# Patient Record
Sex: FEMALE | Race: WHITE | NOT HISPANIC OR LATINO | Employment: OTHER | URBAN - METROPOLITAN AREA
[De-identification: names, ages, dates, MRNs, and addresses within clinical notes are randomized per-mention and may not be internally consistent; named-entity substitution may affect disease eponyms.]

---

## 2017-03-28 ENCOUNTER — HOSPITAL ENCOUNTER (OUTPATIENT)
Dept: MAMMOGRAPHY | Facility: HOSPITAL | Age: 77
Discharge: HOME | End: 2017-03-28

## 2017-03-28 DIAGNOSIS — Z12.31 VISIT FOR SCREENING MAMMOGRAM: ICD-10-CM

## 2018-08-15 ENCOUNTER — HOSPITAL ENCOUNTER (OUTPATIENT)
Dept: MAMMOGRAPHY | Facility: CLINIC | Age: 78
Discharge: HOME | End: 2018-08-15

## 2018-08-15 DIAGNOSIS — Z12.31 SCREENING MAMMOGRAM, ENCOUNTER FOR: ICD-10-CM

## 2020-03-17 ENCOUNTER — HOSPITAL ENCOUNTER (INPATIENT)
Facility: CLINIC | Age: 80
Setting detail: SURGERY ADMIT
End: 2020-03-17
Attending: NEUROLOGICAL SURGERY | Admitting: NEUROLOGICAL SURGERY
Payer: MEDICARE

## 2021-05-24 ENCOUNTER — RECORDS - HEALTHEAST (OUTPATIENT)
Dept: ADMINISTRATIVE | Facility: CLINIC | Age: 81
End: 2021-05-24

## 2021-05-25 ENCOUNTER — RECORDS - HEALTHEAST (OUTPATIENT)
Dept: ADMINISTRATIVE | Facility: CLINIC | Age: 81
End: 2021-05-25

## 2021-05-26 ENCOUNTER — RECORDS - HEALTHEAST (OUTPATIENT)
Dept: ADMINISTRATIVE | Facility: CLINIC | Age: 81
End: 2021-05-26

## 2021-05-27 ENCOUNTER — RECORDS - HEALTHEAST (OUTPATIENT)
Dept: ADMINISTRATIVE | Facility: CLINIC | Age: 81
End: 2021-05-27

## 2021-05-28 ENCOUNTER — RECORDS - HEALTHEAST (OUTPATIENT)
Dept: ADMINISTRATIVE | Facility: CLINIC | Age: 81
End: 2021-05-28

## 2021-05-29 ENCOUNTER — RECORDS - HEALTHEAST (OUTPATIENT)
Dept: ADMINISTRATIVE | Facility: CLINIC | Age: 81
End: 2021-05-29

## 2021-05-30 ENCOUNTER — RECORDS - HEALTHEAST (OUTPATIENT)
Dept: ADMINISTRATIVE | Facility: CLINIC | Age: 81
End: 2021-05-30

## 2021-07-13 ENCOUNTER — RECORDS - HEALTHEAST (OUTPATIENT)
Dept: ADMINISTRATIVE | Facility: CLINIC | Age: 81
End: 2021-07-13

## 2021-07-21 ENCOUNTER — RECORDS - HEALTHEAST (OUTPATIENT)
Dept: ADMINISTRATIVE | Facility: CLINIC | Age: 81
End: 2021-07-21

## 2024-08-05 ENCOUNTER — HOSPITAL ENCOUNTER (EMERGENCY)
Facility: HOSPITAL | Age: 84
Discharge: HOME | End: 2024-08-05
Admitting: STUDENT IN AN ORGANIZED HEALTH CARE EDUCATION/TRAINING PROGRAM
Payer: MEDICARE

## 2024-08-05 ENCOUNTER — APPOINTMENT (OUTPATIENT)
Dept: CT IMAGING | Facility: HOSPITAL | Age: 84
End: 2024-08-05
Attending: STUDENT IN AN ORGANIZED HEALTH CARE EDUCATION/TRAINING PROGRAM
Payer: MEDICARE

## 2024-08-05 VITALS
WEIGHT: 110 LBS | TEMPERATURE: 98.1 F | SYSTOLIC BLOOD PRESSURE: 129 MMHG | BODY MASS INDEX: 18.78 KG/M2 | HEIGHT: 64 IN | OXYGEN SATURATION: 99 % | HEART RATE: 57 BPM | RESPIRATION RATE: 16 BRPM | DIASTOLIC BLOOD PRESSURE: 60 MMHG

## 2024-08-05 DIAGNOSIS — J10.1 INFLUENZA A: ICD-10-CM

## 2024-08-05 LAB
ALBUMIN SERPL BCG-MCNC: 4.1 G/DL (ref 3.5–5.2)
ALBUMIN UR-MCNC: NEGATIVE MG/DL
ALP SERPL-CCNC: 65 U/L (ref 40–150)
ALT SERPL W P-5'-P-CCNC: 19 U/L (ref 0–50)
ANION GAP SERPL CALCULATED.3IONS-SCNC: 18 MMOL/L (ref 7–15)
APPEARANCE UR: CLEAR
AST SERPL W P-5'-P-CCNC: 30 U/L (ref 0–45)
BASOPHILS # BLD AUTO: 0 10E3/UL (ref 0–0.2)
BASOPHILS NFR BLD AUTO: 0 %
BILIRUB DIRECT SERPL-MCNC: 0.21 MG/DL (ref 0–0.3)
BILIRUB SERPL-MCNC: 0.9 MG/DL
BILIRUB UR QL STRIP: NEGATIVE
BUN SERPL-MCNC: 10.8 MG/DL (ref 8–23)
CALCIUM SERPL-MCNC: 8.8 MG/DL (ref 8.8–10.4)
CHLORIDE SERPL-SCNC: 95 MMOL/L (ref 98–107)
COLOR UR AUTO: COLORLESS
CREAT SERPL-MCNC: 0.69 MG/DL (ref 0.51–0.95)
EGFRCR SERPLBLD CKD-EPI 2021: 86 ML/MIN/1.73M2
EOSINOPHIL # BLD AUTO: 0 10E3/UL (ref 0–0.7)
EOSINOPHIL NFR BLD AUTO: 0 %
ERYTHROCYTE [DISTWIDTH] IN BLOOD BY AUTOMATED COUNT: 11.8 % (ref 10–15)
GLUCOSE SERPL-MCNC: 88 MG/DL (ref 70–99)
GLUCOSE UR STRIP-MCNC: NEGATIVE MG/DL
HCO3 SERPL-SCNC: 22 MMOL/L (ref 22–29)
HCT VFR BLD AUTO: 45.8 % (ref 35–47)
HGB BLD-MCNC: 15.5 G/DL (ref 11.7–15.7)
HGB UR QL STRIP: NEGATIVE
IMM GRANULOCYTES # BLD: 0.1 10E3/UL
IMM GRANULOCYTES NFR BLD: 1 %
KETONES UR STRIP-MCNC: 100 MG/DL
LEUKOCYTE ESTERASE UR QL STRIP: NEGATIVE
LYMPHOCYTES # BLD AUTO: 0.9 10E3/UL (ref 0.8–5.3)
LYMPHOCYTES NFR BLD AUTO: 9 %
MAGNESIUM SERPL-MCNC: 1.9 MG/DL (ref 1.7–2.3)
MCH RBC QN AUTO: 29.5 PG (ref 26.5–33)
MCHC RBC AUTO-ENTMCNC: 33.8 G/DL (ref 31.5–36.5)
MCV RBC AUTO: 87 FL (ref 78–100)
MONOCYTES # BLD AUTO: 0.8 10E3/UL (ref 0–1.3)
MONOCYTES NFR BLD AUTO: 8 %
MUCOUS THREADS #/AREA URNS LPF: PRESENT /LPF
NEUTROPHILS # BLD AUTO: 7.9 10E3/UL (ref 1.6–8.3)
NEUTROPHILS NFR BLD AUTO: 82 %
NITRATE UR QL: NEGATIVE
NRBC # BLD AUTO: 0 10E3/UL
NRBC BLD AUTO-RTO: 0 /100
PH UR STRIP: 5.5 [PH] (ref 5–7)
PLATELET # BLD AUTO: 314 10E3/UL (ref 150–450)
POTASSIUM SERPL-SCNC: 4 MMOL/L (ref 3.4–5.3)
PROT SERPL-MCNC: 6.7 G/DL (ref 6.4–8.3)
RBC # BLD AUTO: 5.26 10E6/UL (ref 3.8–5.2)
RBC URINE: <1 /HPF
SODIUM SERPL-SCNC: 135 MMOL/L (ref 135–145)
SP GR UR STRIP: 1.01 (ref 1–1.03)
SQUAMOUS EPITHELIAL: 1 /HPF
TROPONIN T SERPL HS-MCNC: <6 NG/L
UROBILINOGEN UR STRIP-MCNC: <2 MG/DL
WBC # BLD AUTO: 9.6 10E3/UL (ref 4–11)
WBC URINE: 1 /HPF

## 2024-08-05 PROCEDURE — 258N000003 HC RX IP 258 OP 636: Performed by: STUDENT IN AN ORGANIZED HEALTH CARE EDUCATION/TRAINING PROGRAM

## 2024-08-05 PROCEDURE — 85004 AUTOMATED DIFF WBC COUNT: CPT | Performed by: STUDENT IN AN ORGANIZED HEALTH CARE EDUCATION/TRAINING PROGRAM

## 2024-08-05 PROCEDURE — 250N000011 HC RX IP 250 OP 636: Performed by: STUDENT IN AN ORGANIZED HEALTH CARE EDUCATION/TRAINING PROGRAM

## 2024-08-05 PROCEDURE — 71260 CT THORAX DX C+: CPT | Mod: MG

## 2024-08-05 PROCEDURE — 80053 COMPREHEN METABOLIC PANEL: CPT | Performed by: STUDENT IN AN ORGANIZED HEALTH CARE EDUCATION/TRAINING PROGRAM

## 2024-08-05 PROCEDURE — 83735 ASSAY OF MAGNESIUM: CPT | Performed by: STUDENT IN AN ORGANIZED HEALTH CARE EDUCATION/TRAINING PROGRAM

## 2024-08-05 PROCEDURE — 96374 THER/PROPH/DIAG INJ IV PUSH: CPT | Mod: 59

## 2024-08-05 PROCEDURE — 93005 ELECTROCARDIOGRAM TRACING: CPT | Performed by: STUDENT IN AN ORGANIZED HEALTH CARE EDUCATION/TRAINING PROGRAM

## 2024-08-05 PROCEDURE — 96361 HYDRATE IV INFUSION ADD-ON: CPT

## 2024-08-05 PROCEDURE — 36415 COLL VENOUS BLD VENIPUNCTURE: CPT | Performed by: STUDENT IN AN ORGANIZED HEALTH CARE EDUCATION/TRAINING PROGRAM

## 2024-08-05 PROCEDURE — 81003 URINALYSIS AUTO W/O SCOPE: CPT | Performed by: STUDENT IN AN ORGANIZED HEALTH CARE EDUCATION/TRAINING PROGRAM

## 2024-08-05 PROCEDURE — 99285 EMERGENCY DEPT VISIT HI MDM: CPT | Mod: 25

## 2024-08-05 PROCEDURE — 84484 ASSAY OF TROPONIN QUANT: CPT | Performed by: STUDENT IN AN ORGANIZED HEALTH CARE EDUCATION/TRAINING PROGRAM

## 2024-08-05 RX ORDER — ONDANSETRON 2 MG/ML
4 INJECTION INTRAMUSCULAR; INTRAVENOUS ONCE
Status: COMPLETED | OUTPATIENT
Start: 2024-08-05 | End: 2024-08-05

## 2024-08-05 RX ORDER — IOPAMIDOL 755 MG/ML
90 INJECTION, SOLUTION INTRAVASCULAR ONCE
Status: COMPLETED | OUTPATIENT
Start: 2024-08-05 | End: 2024-08-05

## 2024-08-05 RX ORDER — KETOROLAC TROMETHAMINE 15 MG/ML
15 INJECTION, SOLUTION INTRAMUSCULAR; INTRAVENOUS ONCE
Status: COMPLETED | OUTPATIENT
Start: 2024-08-05 | End: 2024-08-05

## 2024-08-05 RX ORDER — ONDANSETRON 4 MG/1
4 TABLET, ORALLY DISINTEGRATING ORAL EVERY 8 HOURS PRN
Qty: 10 TABLET | Refills: 0 | Status: SHIPPED | OUTPATIENT
Start: 2024-08-05

## 2024-08-05 RX ADMIN — IOPAMIDOL 90 ML: 755 INJECTION, SOLUTION INTRAVENOUS at 11:11

## 2024-08-05 RX ADMIN — KETOROLAC TROMETHAMINE 15 MG: 15 INJECTION, SOLUTION INTRAMUSCULAR; INTRAVENOUS at 10:43

## 2024-08-05 RX ADMIN — SODIUM CHLORIDE 1000 ML: 9 INJECTION, SOLUTION INTRAVENOUS at 10:36

## 2024-08-05 RX ADMIN — ONDANSETRON 4 MG: 2 INJECTION INTRAMUSCULAR; INTRAVENOUS at 10:39

## 2024-08-05 ASSESSMENT — ACTIVITIES OF DAILY LIVING (ADL)
ADLS_ACUITY_SCORE: 35
ADLS_ACUITY_SCORE: 37
ADLS_ACUITY_SCORE: 35
ADLS_ACUITY_SCORE: 37
ADLS_ACUITY_SCORE: 37

## 2024-08-05 ASSESSMENT — COLUMBIA-SUICIDE SEVERITY RATING SCALE - C-SSRS
6. HAVE YOU EVER DONE ANYTHING, STARTED TO DO ANYTHING, OR PREPARED TO DO ANYTHING TO END YOUR LIFE?: NO
2. HAVE YOU ACTUALLY HAD ANY THOUGHTS OF KILLING YOURSELF IN THE PAST MONTH?: NO
1. IN THE PAST MONTH, HAVE YOU WISHED YOU WERE DEAD OR WISHED YOU COULD GO TO SLEEP AND NOT WAKE UP?: NO

## 2024-08-05 NOTE — DISCHARGE INSTRUCTIONS
You were seen in the ER for evaluation of weakness and decreased appetite.  Your lab work is unremarkable.  CT scan of your chest does not show any signs of pneumonia.  CT abdomen pelvis is also negative.  Your urine does not show any evidence of urinary tract infection.    Overall I suspect that you are likely still recovering from influenza.  Be sure to push plenty of fluids.  I also recommend a bland diet of bananas, rice, applesauce, and toast until you start feeling better.    You were given fluids, nausea med, and Toradol here in the emergency department with improvement.  You were able to tolerate fluids and saltine crackers.    I given you some Zofran to take as needed at home, you can place one dissolving tablet on your tongue up to every 8 hours as needed for nausea and to help with fluid intake.    Your CT scan shows there is a 4 mm benign-appearing nodule of your left lower lobe of your lung. This is likely an incidental finding.  I recommend following up with your primary care provider to see if you will need a repeat CT for this.  You should call your primary care provider's office upon discharge today to schedule follow-up in clinic in a few days.

## 2024-08-05 NOTE — ED TRIAGE NOTES
"Dx with influenza A last week. Per family not eating and drinking \"much.\" Pt reports feeling very weak and having periumbilical pain.      Triage Assessment (Adult)       Row Name 08/05/24 0920          Triage Assessment    Airway WDL WDL        Respiratory WDL    Respiratory WDL WDL        Skin Circulation/Temperature WDL    Skin Circulation/Temperature WDL WDL        Cardiac WDL    Cardiac WDL WDL        Peripheral/Neurovascular WDL    Peripheral Neurovascular WDL WDL        Cognitive/Neuro/Behavioral WDL    Cognitive/Neuro/Behavioral WDL WDL                     "

## 2024-08-05 NOTE — ED PROVIDER NOTES
"Emergency Department Encounter   NAME: Ya Reyez ; AGE: 83 year old female ; YOB: 1940 ; MRN: [id number] ; PCP: Christelle Strickland   ED PROVIDER: Rebecca Yuen PA-C    Evaluation Date & Time:   8/5/2024  9:29 AM    CHIEF COMPLAINT:  Generalized Weakness        Impression and Plan   FINAL IMPRESSION:    ICD-10-CM    1. Influenza A  J10.1           MDM:  Ya is an 83 year old female with PMH of anxiety, GERD, gastric polyps, IBS, and osteoarthritis presenting to the emergency department with family members for evaluation of decreased appetite and weakness.  Patient was diagnosed with influenza A last week, per her daughter she has had decreased appetite and decreased fluid intake since.  Has been able to take in fluids without vomiting, although patient's daughter feels it is likely not enough. Patient endorses overall just feeling very weak the past few days. She also endorses a frontal headache that feels \"like a squeezing vice\" on her head.  Endorses occasional nausea with one episode of vomiting in the past week. She also endorses periumbilical/suprapubic pain. Per her son, patient has had pain in this area for many months.  She has had cystoscopy performed and evaluation by urology with no findings. Her last bowel movement was yesterday, she endorses diarrhea since her influenza diagnosis. Denies any urinary symptoms.    Per chart review, pt was seen 7/28/24 by the urgency room and was diagnosed with influenza A. She was started on Tamiflu as well as azithromycin and cefuroxime.    Vitals reviewed and unremarkable, Temp 98.1F without antipyretics on board. On exam she is resting comfortably in the bed, non toxic appearing. Differential diagnosis includes but not limited to UTI, constipation, influenza, COVID, bronchitis, dehydration, pneumonia. She was given migraine cocktail of Toradol, Zofran, and 1L normal saline.   Troponin <6 and not significant for ACS.  Her EKG shows sinus rhythm, no " prolonged QT, concerning arrhythmia, or ischemia.  There is possible increase in T wave amplitude when compared to previous EKG. The rest of her lab work is otherwise unremarkable.  She has no evidence of leukocytosis or significant electrolyte abnormality.  She is afebrile with stable vitals. CT chest abdomen pelvis finds mild mucosal hyperenhancement of the ureters and bladder, could correlate to UTI in proper clinical setting. Her UA is normal and she does not endorse any urinary symptoms, however, so I think this is unlikely. There is also evidence of liquid stool and some borderline mural thickening, could be consistent with proctocolitis.  Patient does not have any known exposures to food or water borne illness and at this point I suspect her diarrhea and colitis is more likely due to influenza at this point. No evidence of pneumonia on CT.    Following fluids, zofran, and toradol patient is endorsing significant improvement. Her headache has subsided. Patient was able to tolerate a full cup of water PO and a saltine crackers. She was able to ambulate up to the bathroom with some assistance as well. Patient normally ambulates at home with a cane. I suspect patient was likely dehydrated from recovering from influenza and this was contributing to her symptoms.  Patient's  is in the room and endorses feeling like he can care for her at home. Given negative work up and reassuring scans I think she is safe for discharge home at this point with plan for continued fluid replacement and bland diet. I have also given her a prescription for zofran to help with nausea and fluid replacement. They will call her primary care provider's office upon discharge today and schedule follow-up in a few days. She and her family members were educated on concerning symptoms that would warrant return to the ED and are understanding and agreeable to this plan.    Additionally, there was an incidental finding of 4mm left lower  lung lobe nodule. Recommended patient review this with PCP to determine if patient needs repeat CT.        History:  Supplemental history from: Documented in chart  External Record(s) reviewed: Documented in chart    Work Up:  Chart documentation includes differential considered and any EKGs or imaging independently interpreted by provider, where specified.  In additional to work up documented, I considered the following work up: CT abdomen    External consultation:  Discussion of management with another provider: Documented in chart, if applicable    Complicating factors:  Care impacted by chronic illness: N/A  Care affected by social determinants of health: N/A    Disposition considerations:  I considered admission but patient improved significantly with fluids and medications, now tolerating PO intake. Will discharge home with prescription medications as documented.      ED COURSE:  9:45 AM I met and introduced myself to the patient. I gathered initial history and performed my physical exam. We discussed plan for initial workup.   2:00 PM I rechecked the patient and discussed results, discharge, follow up, and reasons to return to the ED.     At the conclusion of the encounter I discussed the results of all the tests and the disposition. The questions were answered. The patient or family acknowledged understanding and was agreeable with the care plan.          MEDICATIONS GIVEN IN THE EMERGENCY DEPARTMENT:  Medications   sodium chloride 0.9% BOLUS 1,000 mL (0 mLs Intravenous Stopped 8/5/24 1412)   ondansetron (ZOFRAN) injection 4 mg (4 mg Intravenous $Given 8/5/24 1039)   ketorolac (TORADOL) injection 15 mg (15 mg Intravenous $Given 8/5/24 1043)   iopamidol (ISOVUE-370) solution 90 mL (90 mLs Intravenous $Given 8/5/24 1111)         NEW PRESCRIPTIONS STARTED AT TODAY'S ED VISIT:  Discharge Medication List as of 8/5/2024  2:12 PM        START taking these medications    Details   ondansetron (ZOFRAN ODT) 4 MG ODT  "tab Take 1 tablet (4 mg) by mouth every 8 hours as needed for nausea, Disp-10 tablet, R-0, Local Print               HPI   Patient information was obtained from: patient, patient's family members  Use of Intrepreter: N/A     Ya is an 83 year old female with PMH of anxiety, GERD, gastric polyps, IBS, and osteoarthritis presenting to the emergency department with family members for evaluation of decreased appetite and weakness.  Patient was diagnosed with influenza A last week, per her daughter she has had decreased appetite and decreased fluid intake since.  Has been able to take in fluids without vomiting, although patient's daughter feels it is likely not enough. Patient endorses overall just feeling very weak the past few days. She also endorses a frontal headache that feels \"like a squeezing vice\" on her head.  Endorses occasional nausea with one episode of vomiting in the past week. She also endorses periumbilical/suprapubic pain. Per her son, patient has had pain in this area for many months.  She has had cystoscopy performed and evaluation by urology with no findings. Her last bowel movement was yesterday, she endorses diarrhea since her influenza diagnosis. Denies any urinary symptoms.    Per chart review, pt was seen 7/28/24 by the urgency room and was diagnosed with influenza A. She was started on Tamiflu as well as azithromycin and cefuroxime.        Medical History     No past medical history on file.    Past Surgical History:   Procedure Laterality Date    BREAST CYST EXCISION Left 1995    HYSTERECTOMY  1984    OOPHORECTOMY Left 1989       Family History   Problem Relation Age of Onset    Breast Cancer Paternal Aunt 65.00    Breast Cancer Cousin 35.00            ondansetron (ZOFRAN ODT) 4 MG ODT tab          Physical Exam     First Vitals:  Patient Vitals for the past 24 hrs:   BP Temp Pulse Resp SpO2 Height Weight   08/05/24 1400 129/60 -- 57 -- 99 % -- --   08/05/24 1341 -- -- 64 -- 100 % -- -- " "  08/05/24 1330 131/63 -- 64 -- 99 % -- --   08/05/24 1323 -- -- 65 -- 100 % -- --   08/05/24 1308 129/63 -- -- -- -- -- --   08/05/24 1257 114/69 -- 53 16 97 % -- --   08/05/24 1242 130/62 -- 61 14 96 % -- --   08/05/24 1200 136/63 -- 63 17 98 % -- --   08/05/24 1130 137/65 -- 64 19 97 % 1.626 m (5' 4\") 49.9 kg (110 lb)   08/05/24 1115 136/63 -- 63 18 98 % -- --   08/05/24 0945 137/57 -- 65 19 98 % -- --   08/05/24 0920 130/62 98.1  F (36.7  C) 77 16 98 % -- --         PHYSICAL EXAM    General Appearance:  Alert, cooperative, no distress, appears stated age. Non-toxic appearing. Not pale or diaphoretic.  HENT: Normocephalic without obvious deformity, atraumatic. Mucous membranes moist. Posterior pharynx is not erythematous or edematous, no exudates. No tonsillar swelling. No uvular swelling or deviation. No abscess or swelling of the floor of the mouth. No tongue protrusion or drooling. No facial or neck swelling. Turbinates not erythematous or edematous.   Eyes: Conjunctiva clear, Lids normal. No discharge.   Respiratory: No distress. Lungs clear to ausculation bilaterally. No wheezes, rhonchi or stridor  Cardiovascular: Regular rate and rhythm, no murmur. Normal cap refill. No peripheral edema  GI: Abdomen soft, nontender  : No CVA tenderness  Musculoskeletal: Moving all extremities. No gross deformities  Integument: Warm, dry, no rashes or lesions  Neurologic: Alert and orientated x3. No focal deficits.  Psych: Normal mood and affect        Results     LAB:  All pertinent labs reviewed and interpreted  Labs Ordered and Resulted from Time of ED Arrival to Time of ED Departure   BASIC METABOLIC PANEL - Abnormal       Result Value    Sodium 135      Potassium 4.0      Chloride 95 (*)     Carbon Dioxide (CO2) 22      Anion Gap 18 (*)     Urea Nitrogen 10.8      Creatinine 0.69      GFR Estimate 86      Calcium 8.8      Glucose 88     CBC WITH PLATELETS AND DIFFERENTIAL - Abnormal    WBC Count 9.6      RBC Count " 5.26 (*)     Hemoglobin 15.5      Hematocrit 45.8      MCV 87      MCH 29.5      MCHC 33.8      RDW 11.8      Platelet Count 314      % Neutrophils 82      % Lymphocytes 9      % Monocytes 8      % Eosinophils 0      % Basophils 0      % Immature Granulocytes 1      NRBCs per 100 WBC 0      Absolute Neutrophils 7.9      Absolute Lymphocytes 0.9      Absolute Monocytes 0.8      Absolute Eosinophils 0.0      Absolute Basophils 0.0      Absolute Immature Granulocytes 0.1      Absolute NRBCs 0.0     ROUTINE UA WITH MICROSCOPIC REFLEX TO CULTURE - Abnormal    Color Urine Colorless      Appearance Urine Clear      Glucose Urine Negative      Bilirubin Urine Negative      Ketones Urine 100 (*)     Specific Gravity Urine 1.015      Blood Urine Negative      pH Urine 5.5      Protein Albumin Urine Negative      Urobilinogen Urine <2.0      Nitrite Urine Negative      Leukocyte Esterase Urine Negative      Mucus Urine Present (*)     RBC Urine <1      WBC Urine 1      Squamous Epithelials Urine 1     HEPATIC FUNCTION PANEL - Normal    Protein Total 6.7      Albumin 4.1      Bilirubin Total 0.9      Alkaline Phosphatase 65      AST 30      ALT 19      Bilirubin Direct 0.21     MAGNESIUM - Normal    Magnesium 1.9     TROPONIN T, HIGH SENSITIVITY - Normal    Troponin T, High Sensitivity <6         RADIOLOGY:  CT Chest/Abdomen/Pelvis w Contrast   Final Result   IMPRESSION:    1.  Mild mucosal hyperenhancement of the intrarenal collecting systems, ureters and bladder mild mural thickening of the bladder. Findings would be compatible with nonspecific urinary tract infection in the proper clinical setting.    2.  Colon and rectum with liquid stool, mucosal hyperenhancement and borderline mural thickening. Findings would be compatible with mild nonspecific proctocolitis in the proper clinical setting.   3.  A 4 mm left lower lobe nodule is probably benign. Please refer to guidelines below.      REFERENCE:   Guidelines for Management  of Incidental Pulmonary Nodules Detected on CT Images: From the Fleischner Society 2017.    Guidelines apply to incidental nodules in patients who are 35 years or older.   Guidelines do not apply to lung cancer screening, patients with immunosuppression, or patients with known primary cancer.      SINGLE NODULE   Nodule size <6 mm   Low-risk patients: No follow-up needed.   High-risk patients: Optional follow-up at 12 months.                     ECG:  Performed at: 09:41    Impression: sinus rhythm    Rate: 66  Rhythm: sinus  Axis: normal  CT Interval: 182  QRS Interval: 76  QTc Interval: 440  ST Changes: none  Comparison: T wave amplitude has increased    EKG results reviewed and interpreted by Dr. Heriberto ED MD.       PROCEDURES:  N/A        Rebecca Yuen PA-C   Emergency Medicine   [facility] EMERGENCY DEPARTMENT       Rebecca Yuen PA-C  08/05/24 1922

## 2024-08-12 LAB
ATRIAL RATE - MUSE: 66 BPM
DIASTOLIC BLOOD PRESSURE - MUSE: 65 MMHG
INTERPRETATION ECG - MUSE: NORMAL
P AXIS - MUSE: 82 DEGREES
PR INTERVAL - MUSE: 182 MS
QRS DURATION - MUSE: 76 MS
QT - MUSE: 420 MS
QTC - MUSE: 440 MS
R AXIS - MUSE: -60 DEGREES
SYSTOLIC BLOOD PRESSURE - MUSE: 145 MMHG
T AXIS - MUSE: 75 DEGREES
VENTRICULAR RATE- MUSE: 66 BPM

## 2024-10-02 ENCOUNTER — HOSPITAL ENCOUNTER (EMERGENCY)
Facility: HOSPITAL | Age: 84
Discharge: HOME | End: 2024-10-02
Attending: EMERGENCY MEDICINE | Admitting: EMERGENCY MEDICINE
Payer: MEDICARE

## 2024-10-02 VITALS
BODY MASS INDEX: 17.17 KG/M2 | TEMPERATURE: 97.7 F | WEIGHT: 100.6 LBS | RESPIRATION RATE: 18 BRPM | HEART RATE: 73 BPM | SYSTOLIC BLOOD PRESSURE: 132 MMHG | OXYGEN SATURATION: 98 % | DIASTOLIC BLOOD PRESSURE: 63 MMHG | HEIGHT: 64 IN

## 2024-10-02 DIAGNOSIS — N39.0 URINARY TRACT INFECTION WITHOUT HEMATURIA, SITE UNSPECIFIED: ICD-10-CM

## 2024-10-02 LAB
ALBUMIN UR-MCNC: NEGATIVE MG/DL
ANION GAP SERPL CALCULATED.3IONS-SCNC: 11 MMOL/L (ref 7–15)
APPEARANCE UR: ABNORMAL
BACTERIA #/AREA URNS HPF: ABNORMAL /HPF
BASOPHILS # BLD AUTO: 0 10E3/UL (ref 0–0.2)
BASOPHILS NFR BLD AUTO: 1 %
BILIRUB UR QL STRIP: NEGATIVE
BUN SERPL-MCNC: 8.9 MG/DL (ref 8–23)
CALCIUM SERPL-MCNC: 9.1 MG/DL (ref 8.8–10.4)
CHLORIDE SERPL-SCNC: 102 MMOL/L (ref 98–107)
COLOR UR AUTO: YELLOW
CREAT SERPL-MCNC: 0.76 MG/DL (ref 0.51–0.95)
EGFRCR SERPLBLD CKD-EPI 2021: 77 ML/MIN/1.73M2
EOSINOPHIL # BLD AUTO: 0 10E3/UL (ref 0–0.7)
EOSINOPHIL NFR BLD AUTO: 0 %
ERYTHROCYTE [DISTWIDTH] IN BLOOD BY AUTOMATED COUNT: 13.1 % (ref 10–15)
GLUCOSE SERPL-MCNC: 130 MG/DL (ref 70–99)
GLUCOSE UR STRIP-MCNC: NEGATIVE MG/DL
HCO3 SERPL-SCNC: 29 MMOL/L (ref 22–29)
HCT VFR BLD AUTO: 45.6 % (ref 35–47)
HGB BLD-MCNC: 14.7 G/DL (ref 11.7–15.7)
HGB UR QL STRIP: NEGATIVE
IMM GRANULOCYTES # BLD: 0 10E3/UL
IMM GRANULOCYTES NFR BLD: 1 %
KETONES UR STRIP-MCNC: NEGATIVE MG/DL
LEUKOCYTE ESTERASE UR QL STRIP: ABNORMAL
LYMPHOCYTES # BLD AUTO: 1.2 10E3/UL (ref 0.8–5.3)
LYMPHOCYTES NFR BLD AUTO: 21 %
MCH RBC QN AUTO: 29.9 PG (ref 26.5–33)
MCHC RBC AUTO-ENTMCNC: 32.2 G/DL (ref 31.5–36.5)
MCV RBC AUTO: 93 FL (ref 78–100)
MONOCYTES # BLD AUTO: 0.4 10E3/UL (ref 0–1.3)
MONOCYTES NFR BLD AUTO: 7 %
MUCOUS THREADS #/AREA URNS LPF: PRESENT /LPF
NEUTROPHILS # BLD AUTO: 4.2 10E3/UL (ref 1.6–8.3)
NEUTROPHILS NFR BLD AUTO: 70 %
NITRATE UR QL: NEGATIVE
NRBC # BLD AUTO: 0 10E3/UL
NRBC BLD AUTO-RTO: 0 /100
PH UR STRIP: 7 [PH] (ref 5–7)
PLATELET # BLD AUTO: 273 10E3/UL (ref 150–450)
POTASSIUM SERPL-SCNC: 4 MMOL/L (ref 3.4–5.3)
RBC # BLD AUTO: 4.91 10E6/UL (ref 3.8–5.2)
RBC URINE: 5 /HPF
SODIUM SERPL-SCNC: 142 MMOL/L (ref 135–145)
SP GR UR STRIP: 1.02 (ref 1–1.03)
UROBILINOGEN UR STRIP-MCNC: <2 MG/DL
WBC # BLD AUTO: 5.9 10E3/UL (ref 4–11)
WBC URINE: 17 /HPF

## 2024-10-02 PROCEDURE — 85004 AUTOMATED DIFF WBC COUNT: CPT | Performed by: EMERGENCY MEDICINE

## 2024-10-02 PROCEDURE — 87086 URINE CULTURE/COLONY COUNT: CPT | Performed by: EMERGENCY MEDICINE

## 2024-10-02 PROCEDURE — 36415 COLL VENOUS BLD VENIPUNCTURE: CPT | Performed by: EMERGENCY MEDICINE

## 2024-10-02 PROCEDURE — 81001 URINALYSIS AUTO W/SCOPE: CPT | Performed by: EMERGENCY MEDICINE

## 2024-10-02 PROCEDURE — 99283 EMERGENCY DEPT VISIT LOW MDM: CPT

## 2024-10-02 PROCEDURE — 85014 HEMATOCRIT: CPT | Performed by: EMERGENCY MEDICINE

## 2024-10-02 PROCEDURE — 80048 BASIC METABOLIC PNL TOTAL CA: CPT | Performed by: EMERGENCY MEDICINE

## 2024-10-02 RX ORDER — LEVOFLOXACIN 500 MG/1
500 TABLET, FILM COATED ORAL DAILY
Qty: 7 TABLET | Refills: 0 | Status: SHIPPED | OUTPATIENT
Start: 2024-10-02 | End: 2024-10-09

## 2024-10-02 ASSESSMENT — COLUMBIA-SUICIDE SEVERITY RATING SCALE - C-SSRS
2. HAVE YOU ACTUALLY HAD ANY THOUGHTS OF KILLING YOURSELF IN THE PAST MONTH?: NO
1. IN THE PAST MONTH, HAVE YOU WISHED YOU WERE DEAD OR WISHED YOU COULD GO TO SLEEP AND NOT WAKE UP?: NO
6. HAVE YOU EVER DONE ANYTHING, STARTED TO DO ANYTHING, OR PREPARED TO DO ANYTHING TO END YOUR LIFE?: NO

## 2024-10-02 ASSESSMENT — ACTIVITIES OF DAILY LIVING (ADL)
ADLS_ACUITY_SCORE: 33
ADLS_ACUITY_SCORE: 35
ADLS_ACUITY_SCORE: 35

## 2024-10-02 NOTE — ED PROVIDER NOTES
EMERGENCY DEPARTMENT ENCOUNTER     NAME: Ya Reyez   AGE: 84 year old female   YOB: 1940   MRN: [id number]   EVALUATION DATE & TIME: 10/2/2024  7:48 AM   PCP: Kellie, [place]     Chief Complaint   Patient presents with    Bladder Problems   :    FINAL IMPRESSION       1. Urinary tract infection without hematuria, site unspecified           ED COURSE & MEDICAL DECISION MAKING      7:52 AM I met with the patient to gather history and to perform my initial exam. We discussed plans for the ED course, including diagnostic testing and treatment.      Pertinent Labs & Imaging studies reviewed. (See chart for details)   84 year old female  presents to the Emergency Department for evaluation of suprapubic discomfort.  She has no flank pain, fever, nausea vomiting.  On chart review, patient does have a history of frequent UTIs.  6 days ago, she was seen at an urgent care, had a urinalysis with a culture and reviewing the results it looks like she grew Pseudomonas.  With continued suprapubic and urinary symptoms I suspect ongoing UTI as she is not otherwise on antibiotics right now and had received just 1 dose of fosfomycin.  I did do some labs and there are no signs of renal dysfunction, she is afebrile, and she has no leukocytosis.  There is no flank pain to suggest something like a stone or pyelonephritis.  Given that she is so well-appearing and based on the culture and sensitivity showing sensitivity to Levaquin.  I am going to place her on a 7-day course of levofloxacin and discharge cautiously with home care and return precautions including signs of pyelonephritis.  Her and her  are quite comfortable with this plan at time of discharge. Initial Vitals Reviewed.           At the conclusion of the encounter I discussed the results of all of the tests and the disposition. The questions were answered. The patient or family acknowledged understanding and was agreeable with the care  plan.     0 minutes critical care time, see procedure note below for details if relevant    Medical Decision Making    History:  Supplemental history from: Documented in chart, family member  External Record(s) reviewed: Documented in chart, office visit and urine culture from 9/26/2024    Work Up:  Chart documentation includes differential considered and any EKGs or imaging independently interpreted by provider, where specified.  In additional to work up documented, I considered the following work up: Documented in chart, if applicable.    External consultation:  Discussion of management with another provider: Documented in chart, if applicable    Complicating factors:  Care impacted by chronic illness: Chronic Lung Disease, Dementia, Mental Health, and Other: GERD, Osteoarthritis, Osteopenia  Care affected by social determinants of health: Access to Medical Care    Disposition considerations: Discharge. I prescribed additional prescription strength medication(s) as charted. I considered admission, but ultimately discharged patient with reassuring workup.    Not Applicable            MEDICATIONS GIVEN IN THE EMERGENCY:   Medications - No data to display   NEW PRESCRIPTIONS STARTED AT TODAY'S ER VISIT   New Prescriptions    LEVOFLOXACIN (LEVAQUIN) 500 MG TABLET    Take 1 tablet (500 mg) by mouth daily for 7 days.     ================================================================   HISTORY OF PRESENT ILLNESS       Patient information was obtained from: Patient.    Use of Intrepreter: N/A     Ya Reyez is a 84 year old female with a pertinent medical history of UTI, atrophic vaginitis, chronic pelvic pain, basal cell carcinoma, anxiety, who presents to the ED for evaluation of abdominal pain.     Per Chart Review, patient was seen at [facility] on 09/26/24 for concerns of a recurrent UTI. The patient was treated for a UTI on 09/06/24. She was initially started on Macrobid and  then switched to Cefuroxime when the urine culture results came back. On 09/25/24 the patient again developed dysuria, and so she was prompted to visit the Urgent Care. Urinalysis was significant for urinary tract infection. From the urine culture results on 9/6/2024 only IV antibiotics were deemed susceptible to the pseudomonas aeruginosa bacteria and so this current case was then discussed with Dr. So Alcaraz and the [place] [facility] Antimicrobial stewardship was reviewed. Trial of fosfomycin was recommended. It was discussed with patient that if her symptoms did not improve or worsen she was to seek emergent care and would need IV antibiotics to treat this. Patient was discharged in stable condition. Urine Culture from 09/26/24 resulted positive for greater than 100,000 CFU/mL of Pseudomonas aeruginosa.     Patient reports that recently she has been having suprapubic abdominal pain. She endorses a PMH of UTIs. She denies currently being on any antibiotics. She denies any recent dysuria, urinary frequency, flank pain, vomiting, fever, or any other complications at this time.     ================================================================        PAST HISTORY     PAST MEDICAL HISTORY:   History reviewed. No pertinent past medical history.   PAST SURGICAL HISTORY:   Past Surgical History:   Procedure Laterality Date    BREAST CYST EXCISION Left 1995    HYSTERECTOMY  1984    OOPHORECTOMY Left 1989      CURRENT MEDICATIONS:   ondansetron (ZOFRAN ODT) 4 MG ODT tab      ALLERGIES:   No Known Allergies   FAMILY HISTORY:   Family History   Problem Relation Age of Onset    Breast Cancer Paternal Aunt 65.00    Breast Cancer Cousin 35.00      SOCIAL HISTORY:   Social History     Socioeconomic History    Marital status:      Social Determinants of Health     Financial Resource Strain: Low Risk  (11/8/2023)    Received from [facility]    Financial Resource Strain      "Difficulty of Paying Living Expenses: 3   Food Insecurity: No Food Insecurity (11/8/2023)    Received from [facility]    Food Insecurity     Worried About Running Out of Food in the Last Year: 1   Transportation Needs: No Transportation Needs (11/8/2023)    Received from [facility]    Transportation Needs     Lack of Transportation (Medical): 1   Social Connections: Socially Integrated (11/8/2023)    Received from [facility]    Social Connections     Frequency of Communication with Friends and Family: 0   Housing Stability: Low Risk  (11/8/2023)    Received from [facility]    Housing Stability     Unable to Pay for Housing in the Last Year: 1        VITALS  Patient Vitals for the past 24 hrs:   BP Temp Temp src Pulse Resp SpO2 Height Weight   10/02/24 0743 132/63 97.7  F (36.5  C) Oral 73 18 98 % 1.626 m (5' 4\") 45.6 kg (100 lb 9.6 oz)        ================================================================    PHYSICAL EXAM     VITAL SIGNS: /63   Pulse 73   Temp 97.7  F (36.5  C) (Oral)   Resp 18   Ht 1.626 m (5' 4\")   Wt 45.6 kg (100 lb 9.6 oz)   SpO2 98%   BMI 17.27 kg/m     Constitutional:  Awake, no acute distress   HENT:  Atraumatic, oropharynx without exudate or erythema, membranes moist  Lymph:  No adenopathy  Eyes: EOM intact, PERRL, no injection  Neck: Supple  Respiratory:  Clear to auscultation bilaterally, no wheezes or crackles   Cardiovascular:  Regular rate and rhythm, single S1 and S2   GI:  Mild suprapubic abdominal tenderness present. Soft, nondistended, no rebound or guarding   Musculoskeletal:  Moves all extremities, no lower extremity edema, no deformities    Skin:  Warm, dry  Neurologic:  Alert and oriented x3, no focal deficits noted       ================================================================  LAB       All pertinent labs reviewed and " interpreted.   Labs Ordered and Resulted from Time of ED Arrival to Time of ED Departure   BASIC METABOLIC PANEL - Abnormal       Result Value    Sodium 142      Potassium 4.0      Chloride 102      Carbon Dioxide (CO2) 29      Anion Gap 11      Urea Nitrogen 8.9      Creatinine 0.76      GFR Estimate 77      Calcium 9.1      Glucose 130 (*)    CBC WITH PLATELETS AND DIFFERENTIAL    WBC Count 5.9      RBC Count 4.91      Hemoglobin 14.7      Hematocrit 45.6      MCV 93      MCH 29.9      MCHC 32.2      RDW 13.1      Platelet Count 273      % Neutrophils 70      % Lymphocytes 21      % Monocytes 7      % Eosinophils 0      % Basophils 1      % Immature Granulocytes 1      NRBCs per 100 WBC 0      Absolute Neutrophils 4.2      Absolute Lymphocytes 1.2      Absolute Monocytes 0.4      Absolute Eosinophils 0.0      Absolute Basophils 0.0      Absolute Immature Granulocytes 0.0      Absolute NRBCs 0.0     ROUTINE UA WITH MICROSCOPIC REFLEX TO CULTURE        ===============================================================  RADIOLOGY       Reviewed all pertinent imaging. Please see official radiology report.   No orders to display         ================================================================  EKG         I have independently reviewed and interpreted the EKG(s) documented above.     ================================================================  PROCEDURES         I, Donn Larkin, am serving as a scribe to document services personally performed by Dr. Lin based on my observation and the provider's statements to me. I, Asiya Lin MD attest that Donn Larkin is acting in a scribe capacity, has observed my performance of the services and has documented them in accordance with my direction.   Asiya Lin M.D.   Emergency Medicine   [facility] EMERGENCY DEPARTMENT  [facility] [facility] [address]  [phone]  Dept: [phone]         Asiya Lin MD  10/02/24 1000

## 2024-10-02 NOTE — ED TRIAGE NOTES
Amb to triage.  Pt states having bladder infection sx for many months(January) worse in last couple of days and today was referred to ED for burning with urination and lower abd pain.  Denies blood in urine.       Triage Assessment (Adult)       Row Name 10/02/24 0745          Triage Assessment    Airway WDL WDL        Respiratory WDL    Respiratory WDL WDL        Skin Circulation/Temperature WDL    Skin Circulation/Temperature WDL WDL        Cardiac WDL    Cardiac WDL WDL        Peripheral/Neurovascular WDL    Peripheral Neurovascular WDL WDL        Cognitive/Neuro/Behavioral WDL    Cognitive/Neuro/Behavioral WDL WDL

## 2024-10-02 NOTE — DISCHARGE INSTRUCTIONS
Fortunately the lab work looks reassuring.  Like we discussed, based on the culture that was done of the urine at your last visit last week, we are choosing an antibiotic that should treat the symptoms.  If you start having vomiting, pain where your kidneys are in your upper back, or fever these would be signs that it is not working and you would need to come back to the ER for IV antibiotics.

## 2024-10-02 NOTE — ED NOTES
She has a known bladder infection according to the ED provider in the room. She is not on any antibiotics. Her bladder is clear from bladder scan with 2mL of urine at this time. She is here with her significant other.

## 2024-10-03 LAB — BACTERIA UR CULT: NORMAL
